# Patient Record
Sex: MALE | Race: WHITE | NOT HISPANIC OR LATINO | Employment: FULL TIME | URBAN - METROPOLITAN AREA
[De-identification: names, ages, dates, MRNs, and addresses within clinical notes are randomized per-mention and may not be internally consistent; named-entity substitution may affect disease eponyms.]

---

## 2020-06-21 ENCOUNTER — HOSPITAL ENCOUNTER (EMERGENCY)
Facility: HOSPITAL | Age: 30
Discharge: HOME | End: 2020-06-21
Attending: EMERGENCY MEDICINE | Admitting: EMERGENCY MEDICINE

## 2020-06-21 ENCOUNTER — APPOINTMENT (OUTPATIENT)
Dept: GENERAL RADIOLOGY | Facility: HOSPITAL | Age: 30
End: 2020-06-21

## 2020-06-21 VITALS
DIASTOLIC BLOOD PRESSURE: 93 MMHG | HEART RATE: 80 BPM | WEIGHT: 210 LBS | SYSTOLIC BLOOD PRESSURE: 144 MMHG | HEIGHT: 73 IN | OXYGEN SATURATION: 100 % | TEMPERATURE: 97.3 F | BODY MASS INDEX: 27.83 KG/M2 | RESPIRATION RATE: 16 BRPM

## 2020-06-21 DIAGNOSIS — R07.9 ACUTE CHEST PAIN: Primary | ICD-10-CM

## 2020-06-21 DIAGNOSIS — R00.2 PALPITATIONS: ICD-10-CM

## 2020-06-21 LAB
ALBUMIN SERPL-MCNC: 4.7 G/DL (ref 3.5–5.2)
ALBUMIN/GLOB SERPL: 1.8 G/DL
ALP SERPL-CCNC: 77 U/L (ref 39–117)
ALT SERPL W P-5'-P-CCNC: 20 U/L (ref 1–41)
ANION GAP SERPL CALCULATED.3IONS-SCNC: 12 MMOL/L (ref 5–15)
AST SERPL-CCNC: 20 U/L (ref 1–40)
BASOPHILS # BLD AUTO: 0.04 10*3/MM3 (ref 0–0.2)
BASOPHILS NFR BLD AUTO: 0.5 % (ref 0–1.5)
BILIRUB SERPL-MCNC: 0.2 MG/DL (ref 0.2–1.2)
BUN BLD-MCNC: 8 MG/DL (ref 6–20)
BUN/CREAT SERPL: 8.5 (ref 7–25)
CALCIUM SPEC-SCNC: 9.1 MG/DL (ref 8.6–10.5)
CHLORIDE SERPL-SCNC: 102 MMOL/L (ref 98–107)
CO2 SERPL-SCNC: 25 MMOL/L (ref 22–29)
CREAT BLD-MCNC: 0.94 MG/DL (ref 0.76–1.27)
D DIMER PPP FEU-MCNC: <0.27 MCGFEU/ML (ref 0–0.56)
DEPRECATED RDW RBC AUTO: 39.9 FL (ref 37–54)
EOSINOPHIL # BLD AUTO: 0.33 10*3/MM3 (ref 0–0.4)
EOSINOPHIL NFR BLD AUTO: 3.8 % (ref 0.3–6.2)
ERYTHROCYTE [DISTWIDTH] IN BLOOD BY AUTOMATED COUNT: 12.5 % (ref 12.3–15.4)
GFR SERPL CREATININE-BSD FRML MDRD: 96 ML/MIN/1.73
GLOBULIN UR ELPH-MCNC: 2.6 GM/DL
GLUCOSE BLD-MCNC: 113 MG/DL (ref 65–99)
HCT VFR BLD AUTO: 45.4 % (ref 37.5–51)
HGB BLD-MCNC: 15.6 G/DL (ref 13–17.7)
HOLD SPECIMEN: NORMAL
HOLD SPECIMEN: NORMAL
IMM GRANULOCYTES # BLD AUTO: 0.02 10*3/MM3 (ref 0–0.05)
IMM GRANULOCYTES NFR BLD AUTO: 0.2 % (ref 0–0.5)
LIPASE SERPL-CCNC: 22 U/L (ref 13–60)
LYMPHOCYTES # BLD AUTO: 2.77 10*3/MM3 (ref 0.7–3.1)
LYMPHOCYTES NFR BLD AUTO: 31.9 % (ref 19.6–45.3)
MAGNESIUM SERPL-MCNC: 2 MG/DL (ref 1.6–2.6)
MCH RBC QN AUTO: 30 PG (ref 26.6–33)
MCHC RBC AUTO-ENTMCNC: 34.4 G/DL (ref 31.5–35.7)
MCV RBC AUTO: 87.3 FL (ref 79–97)
MONOCYTES # BLD AUTO: 0.62 10*3/MM3 (ref 0.1–0.9)
MONOCYTES NFR BLD AUTO: 7.1 % (ref 5–12)
NEUTROPHILS # BLD AUTO: 4.9 10*3/MM3 (ref 1.7–7)
NEUTROPHILS NFR BLD AUTO: 56.5 % (ref 42.7–76)
NRBC BLD AUTO-RTO: 0 /100 WBC (ref 0–0.2)
NT-PROBNP SERPL-MCNC: 18.5 PG/ML (ref 5–450)
PLATELET # BLD AUTO: 336 10*3/MM3 (ref 140–450)
PMV BLD AUTO: 8.9 FL (ref 6–12)
POTASSIUM BLD-SCNC: 4.2 MMOL/L (ref 3.5–5.2)
PROT SERPL-MCNC: 7.3 G/DL (ref 6–8.5)
RBC # BLD AUTO: 5.2 10*6/MM3 (ref 4.14–5.8)
SODIUM BLD-SCNC: 139 MMOL/L (ref 136–145)
TROPONIN T SERPL-MCNC: <0.01 NG/ML (ref 0–0.03)
TROPONIN T SERPL-MCNC: <0.01 NG/ML (ref 0–0.03)
TSH SERPL DL<=0.05 MIU/L-ACNC: 0.38 UIU/ML (ref 0.27–4.2)
WBC NRBC COR # BLD: 8.68 10*3/MM3 (ref 3.4–10.8)
WHOLE BLOOD HOLD SPECIMEN: NORMAL
WHOLE BLOOD HOLD SPECIMEN: NORMAL

## 2020-06-21 PROCEDURE — 83735 ASSAY OF MAGNESIUM: CPT | Performed by: NURSE PRACTITIONER

## 2020-06-21 PROCEDURE — 85379 FIBRIN DEGRADATION QUANT: CPT | Performed by: NURSE PRACTITIONER

## 2020-06-21 PROCEDURE — 83690 ASSAY OF LIPASE: CPT | Performed by: NURSE PRACTITIONER

## 2020-06-21 PROCEDURE — 93005 ELECTROCARDIOGRAM TRACING: CPT | Performed by: EMERGENCY MEDICINE

## 2020-06-21 PROCEDURE — 80053 COMPREHEN METABOLIC PANEL: CPT | Performed by: EMERGENCY MEDICINE

## 2020-06-21 PROCEDURE — 83880 ASSAY OF NATRIURETIC PEPTIDE: CPT | Performed by: NURSE PRACTITIONER

## 2020-06-21 PROCEDURE — 99284 EMERGENCY DEPT VISIT MOD MDM: CPT

## 2020-06-21 PROCEDURE — 84443 ASSAY THYROID STIM HORMONE: CPT | Performed by: NURSE PRACTITIONER

## 2020-06-21 PROCEDURE — 85025 COMPLETE CBC W/AUTO DIFF WBC: CPT | Performed by: EMERGENCY MEDICINE

## 2020-06-21 PROCEDURE — 71046 X-RAY EXAM CHEST 2 VIEWS: CPT

## 2020-06-21 PROCEDURE — 84484 ASSAY OF TROPONIN QUANT: CPT | Performed by: EMERGENCY MEDICINE

## 2020-06-21 RX ORDER — SODIUM CHLORIDE 0.9 % (FLUSH) 0.9 %
10 SYRINGE (ML) INJECTION AS NEEDED
Status: DISCONTINUED | OUTPATIENT
Start: 2020-06-21 | End: 2020-06-21 | Stop reason: HOSPADM

## 2020-06-21 RX ORDER — DIAZEPAM 5 MG/1
5 TABLET ORAL ONCE
Status: COMPLETED | OUTPATIENT
Start: 2020-06-21 | End: 2020-06-21

## 2020-06-21 RX ADMIN — SODIUM CHLORIDE 1000 ML: 9 INJECTION, SOLUTION INTRAVENOUS at 17:18

## 2020-06-21 RX ADMIN — DIAZEPAM 5 MG: 5 TABLET ORAL at 17:18

## 2020-06-21 NOTE — ED PROVIDER NOTES
Subjective   Patient presents to the emergency department for chest pain and palpitations that occurred suddenly around 3 hours prior to arrival.  He tells me that his heart felt like it was beating very fast his Apple Watch told him it was around 107 bpm.  This lasted for less than an hour then essentially resolved.  He became concerned as he has not experienced anything like this before and will get checked out.  Tells me typically on Sundays he does experience somewhat of a hangover with some pain in his chest that he attributes to drinking the night before.  However this felt different.  He denies fever cough is not a smoker.      Palpitations   Palpitations quality:  Fast  Onset quality:  Sudden  Duration:  1 hour  Timing:  Constant  Progression:  Resolved  Chronicity:  New  Context: anxiety and dehydration    Relieved by:  Nothing  Worsened by:  Nothing  Ineffective treatments:  None tried  Associated symptoms: chest pain, dizziness and shortness of breath    Associated symptoms: no back pain, no cough, no diaphoresis, no nausea and no vomiting        Review of Systems   Constitutional: Negative for chills, diaphoresis and fever.   HENT: Negative for congestion and sore throat.    Respiratory: Positive for shortness of breath. Negative for cough, choking, chest tightness and wheezing.    Cardiovascular: Positive for chest pain and palpitations. Negative for leg swelling.   Gastrointestinal: Negative for abdominal distention, abdominal pain, anal bleeding, blood in stool, constipation, diarrhea, nausea and vomiting.   Genitourinary: Negative for difficulty urinating, dysuria, flank pain, frequency, hematuria and urgency.   Musculoskeletal: Negative for back pain.   Neurological: Positive for dizziness.   All other systems reviewed and are negative.      No past medical history on file.    No Known Allergies    No past surgical history on file.    No family history on file.    Social History     Socioeconomic  History   • Marital status: Single     Spouse name: Not on file   • Number of children: Not on file   • Years of education: Not on file   • Highest education level: Not on file           Objective   Physical Exam   Constitutional: He is oriented to person, place, and time. He appears well-developed and well-nourished.   HENT:   Head: Normocephalic and atraumatic.   Right Ear: External ear normal.   Left Ear: External ear normal.   Nose: Nose normal.   Mouth/Throat: Oropharynx is clear and moist.   Eyes: Pupils are equal, round, and reactive to light. Conjunctivae and EOM are normal.   Neck: Normal range of motion. Neck supple.   Cardiovascular: Normal rate, regular rhythm, normal heart sounds and intact distal pulses.   Pulmonary/Chest: Effort normal and breath sounds normal.   Abdominal: Soft. Bowel sounds are normal.   Musculoskeletal: Normal range of motion.   Neurological: He is alert and oriented to person, place, and time.   Skin: Skin is warm and dry.   Psychiatric: His behavior is normal. Judgment normal. His mood appears anxious.       Procedures           ED Course  ED Course as of Jun 21 2056   Sun Jun 21, 2020 2054 2- troponins 2- EKGs.  Patient resting comfortably for the duration of ER stay.  Blood work reassuring as well as d-dimer.  I will give the patient a follow-up to the palpitation clinic.  All thankful and agreeable.    [JM]      ED Course User Index  [JM] Rahul Rajput APRN           XR Chest 2 View   Final Result   No acute cardiopulmonary process.       DICTATED:   06/21/2020   EDITED/ls :   06/21/2020        This report was finalized on 6/21/2020 6:57 PM by Dr. Bethel Villavicencio.                                            [facility]    Final diagnoses:   Acute chest pain   Palpitations            Rahul Rajput APRN  06/21/20 2056

## 2020-06-22 NOTE — DISCHARGE INSTRUCTIONS
Follow up with one of the [facility] Primary Care Providers below to setup primary care. If you need assistance coordinating a primary care appointment with a [facility] Primary Care Provider, please contact the Primary Care Coordinators at [phone] for appointment scheduling.    [facility], Primary Care   [address] , [address]   [place], [place] [address] [phone]    [facility] Internal Medicine & Endocrinology  [address], [address]  [place] [address] [phone]    [facility] Family Medicine  [address], [address]   [place] [address] [phone]    [facility] Primary Care  [address], [address]  [place], [place] [address] [phone]    [facility], Primary Care,   [address], [address]   [place], [place] [address] [phone]    [facility] Primary Care  [address]  [place], [place] [address] [phone]    [facility], Primary Care  [address], [address]  [place], [place] [address] [phone]    [facility] Internal Medicine & Pediatrics  [address], [address]   [place] [address] [phone]    [facility], Primary Care  [address] [place] [address] [phone]      [facility] Primary Care  [address], [address]   [place] [address] [phone]    [facility] Family Medicine  [address] [place] [address] [phone]

## 2020-06-24 NOTE — PROGRESS NOTES
[facility]  Heart and Valve Center      Encounter Date:06/25/2020     Jarvis Arnett  [address] [facility] [address]  [unfilled]    1990    Mari Isbell MD    Jarvis Arnett is a 29 y.o. male.      Subjective:     Chief Complaint:  Palpitations and Establish Care       HPI     Answers for HPI/ROS submitted by the patient on 6/23/2020   What is the primary reason for your visit?: Other  Please describe your symptoms.: Sinus Arrhythmia. Erratic Heart Beat - had a PVC every 18ish beats. Shortness of Breath., , More specifically Sunday while sitting on my couch I felt like I was having a heart attack. My heart beat began speeding up and pounding uncontrollably hard, this lasted probably one minute. After breathing exercises it went away however that is what led me to the ER.  Have you had these symptoms before?: Yes  How long have you been having these symptoms?: 1-4 days  Please list any medications you are currently taking for this condition.: None.  Please describe any probable cause for these symptoms. : Alcohol, Lack of sleep, & caffiene is my best guess.    29-year-old male presented to [facility] ED on 6/21/2020 with complaints of palpitations.  Sudden onset of irregular fast heartbeat.  Duration 1 hour.  Patient checked his Apple Watch and heart rate was 107 bpm.  Associated chest pressure dizziness, near syncope, dyspnea. Attempted relaxation and deep breathing.  No improvement.  Worse when lying down.   Patient reports occasionally on Sundays he will have a hangover associated with chest pressure after drinking the night before.  ED evaluation unremarkable.  Referred to the heart valve center for evaluation.    Has continued.  Worse with lying.  Reports intermittent for at least 1 year.  Wax and wanes.  Normal physical activity tolerance.      No premature CAD 1st degree relative.  No family or personal hx of arrythmias    No hx of snoring.  Uses melatonin to help sleep.   "    When drinking 10 plus beers on Saturdays.    Prior to COVID.  No caffiene.  Now 1-2 cups daily.     Pt denies worsening or increased stress.        Patient Active Problem List    Diagnosis Date Noted   • Palpitations 06/25/2020         Past Surgical History:   Procedure Laterality Date   • CYST REMOVAL         No Known Allergies    No current outpatient medications on file.    The following portions of the patient's history were reviewed and updated today during office visit as appropriate: allergies, current medications, past family history, past medical history, past social history, past surgical history and problem list.    Review of Systems   Cardiovascular: Positive for chest pain, near-syncope and palpitations.   Respiratory: Positive for shortness of breath.    Neurological: Positive for dizziness.   Psychiatric/Behavioral: The patient is nervous/anxious.    All other systems reviewed and are negative.      Objective:     Vitals:    06/25/20 0827 06/25/20 0828 06/25/20 0829   BP: 117/73 116/70 120/73   BP Location: Left arm Left arm Right arm   Patient Position: Standing Sitting Sitting   Cuff Size: Adult Adult Adult   Pulse: 114 90 103   Resp:   18   Temp:   97.1 °F (36.2 °C)   TempSrc:   Temporal   SpO2: 99% 98% 99%   Weight:   93.9 kg (207 lb 1 oz)   Height:   185.4 cm (73\")       Body mass index is 27.32 kg/m².        Physical Exam   Constitutional: He is oriented to person, place, and time. He appears well-developed and well-nourished. No distress.   HENT:   Mouth/Throat: Oropharynx is clear and moist.   Eyes: Conjunctivae are normal. No scleral icterus.   Neck: No hepatojugular reflux and no JVD present. Carotid bruit is not present. No tracheal deviation present. No thyromegaly present.   Cardiovascular: Normal rate, regular rhythm, normal heart sounds and intact distal pulses. Exam reveals no friction rub.   No murmur heard.  Pulmonary/Chest: Effort normal and breath sounds normal.   Abdominal: " Soft. Bowel sounds are normal. He exhibits no distension. There is no tenderness.   Musculoskeletal: He exhibits no edema.   Lymphadenopathy:     He has no cervical adenopathy.   Neurological: He is alert and oriented to person, place, and time.   Skin: Skin is warm, dry and intact. No rash noted. No cyanosis or erythema. No pallor.   Psychiatric: He has a normal mood and affect. His behavior is normal. Thought content normal.   Vitals reviewed.      Lab and Diagnostic Review:  Admission on 06/21/2020, Discharged on 06/21/2020   Component Date Value Ref Range Status   • Glucose 06/21/2020 113* 65 - 99 mg/dL Final   • BUN 06/21/2020 8  6 - 20 mg/dL Final   • Creatinine 06/21/2020 0.94  0.76 - 1.27 mg/dL Final   • Sodium 06/21/2020 139  136 - 145 mmol/L Final   • Potassium 06/21/2020 4.2  3.5 - 5.2 mmol/L Final   • Chloride 06/21/2020 102  98 - 107 mmol/L Final   • CO2 06/21/2020 25.0  22.0 - 29.0 mmol/L Final   • Calcium 06/21/2020 9.1  8.6 - 10.5 mg/dL Final   • Total Protein 06/21/2020 7.3  6.0 - 8.5 g/dL Final   • Albumin 06/21/2020 4.70  3.50 - 5.20 g/dL Final   • ALT (SGPT) 06/21/2020 20  1 - 41 U/L Final   • AST (SGOT) 06/21/2020 20  1 - 40 U/L Final   • Alkaline Phosphatase 06/21/2020 77  39 - 117 U/L Final   • Total Bilirubin 06/21/2020 0.2  0.2 - 1.2 mg/dL Final   • eGFR Non African Amer 06/21/2020 96  >60 mL/min/1.73 Final   • Globulin 06/21/2020 2.6  gm/dL Final   • A/G Ratio 06/21/2020 1.8  g/dL Final   • BUN/Creatinine Ratio 06/21/2020 8.5  7.0 - 25.0 Final   • Anion Gap 06/21/2020 12.0  5.0 - 15.0 mmol/L Final   • Troponin T 06/21/2020 <0.010  0.000 - 0.030 ng/mL Final   • Extra Tube 06/21/2020 hold for add-on   Final    Auto resulted   • Extra Tube 06/21/2020 Hold for add-ons.   Final    Auto resulted.   • Extra Tube 06/21/2020 hold for add-on   Final    Auto resulted   • Extra Tube 06/21/2020 Hold for add-ons.   Final    Auto resulted.   • WBC 06/21/2020 8.68  3.40 - 10.80 10*3/mm3 Final   • RBC  06/21/2020 5.20  4.14 - 5.80 10*6/mm3 Final   • Hemoglobin 06/21/2020 15.6  13.0 - 17.7 g/dL Final   • Hematocrit 06/21/2020 45.4  37.5 - 51.0 % Final   • MCV 06/21/2020 87.3  79.0 - 97.0 fL Final   • MCH 06/21/2020 30.0  26.6 - 33.0 pg Final   • MCHC 06/21/2020 34.4  31.5 - 35.7 g/dL Final   • RDW 06/21/2020 12.5  12.3 - 15.4 % Final   • RDW-SD 06/21/2020 39.9  37.0 - 54.0 fl Final   • MPV 06/21/2020 8.9  6.0 - 12.0 fL Final   • Platelets 06/21/2020 336  140 - 450 10*3/mm3 Final   • Neutrophil % 06/21/2020 56.5  42.7 - 76.0 % Final   • Lymphocyte % 06/21/2020 31.9  19.6 - 45.3 % Final   • Monocyte % 06/21/2020 7.1  5.0 - 12.0 % Final   • Eosinophil % 06/21/2020 3.8  0.3 - 6.2 % Final   • Basophil % 06/21/2020 0.5  0.0 - 1.5 % Final   • Immature Grans % 06/21/2020 0.2  0.0 - 0.5 % Final   • Neutrophils, Absolute 06/21/2020 4.90  1.70 - 7.00 10*3/mm3 Final   • Lymphocytes, Absolute 06/21/2020 2.77  0.70 - 3.10 10*3/mm3 Final   • Monocytes, Absolute 06/21/2020 0.62  0.10 - 0.90 10*3/mm3 Final   • Eosinophils, Absolute 06/21/2020 0.33  0.00 - 0.40 10*3/mm3 Final   • Basophils, Absolute 06/21/2020 0.04  0.00 - 0.20 10*3/mm3 Final   • Immature Grans, Absolute 06/21/2020 0.02  0.00 - 0.05 10*3/mm3 Final   • nRBC 06/21/2020 0.0  0.0 - 0.2 /100 WBC Final   • Lipase 06/21/2020 22  13 - 60 U/L Final   • D-Dimer, Quantitative 06/21/2020 <0.27  0.00 - 0.56 MCGFEU/mL Final   • proBNP 06/21/2020 18.5  5.0 - 450.0 pg/mL Final   • TSH 06/21/2020 0.380  0.270 - 4.200 uIU/mL Final   • Magnesium 06/21/2020 2.0  1.6 - 2.6 mg/dL Final   • Troponin T 06/21/2020 <0.010  0.000 - 0.030 ng/mL Final     EKG 6/21/2020: Sinus rhythm with marked sinus arrhythmia 78 bpm.  Repeat EKG 6/21/2020: Sinus rhythm, 69 bpm    Chest x-ray 6/21/2020: No acute cardiopulmonary process  Assessment and Plan:         1. Palpitations    - Holter Monitor - 72 Hour Up To 21 Days; Future    2. Near syncope    - Holter Monitor - 72 Hour Up To 21 Days; Future    3.  Alcohol use  Encouraged to decrease ETOH use  Stay well hydrated    4. Shortness of breath    - Adult Transthoracic Echo Complete W/ Cont if Necessary Per Protocol; Future    F/u 6 weeks or sooner if needed (telehealth visit)    *Please note that portions of this note were completed with a voice recognition program. Efforts were made to edit the dictations, but occasionally words are mistranscribed.

## 2020-06-25 ENCOUNTER — HOSPITAL ENCOUNTER (OUTPATIENT)
Dept: CARDIOLOGY | Facility: HOSPITAL | Age: 30
Discharge: HOME | End: 2020-06-25
Admitting: NURSE PRACTITIONER

## 2020-06-25 ENCOUNTER — OFFICE VISIT (OUTPATIENT)
Dept: CARDIOLOGY | Facility: HOSPITAL | Age: 30
End: 2020-06-25

## 2020-06-25 VITALS
HEIGHT: 73 IN | TEMPERATURE: 97.1 F | RESPIRATION RATE: 18 BRPM | SYSTOLIC BLOOD PRESSURE: 120 MMHG | BODY MASS INDEX: 27.44 KG/M2 | DIASTOLIC BLOOD PRESSURE: 73 MMHG | OXYGEN SATURATION: 99 % | WEIGHT: 207.06 LBS | HEART RATE: 103 BPM

## 2020-06-25 DIAGNOSIS — R55 NEAR SYNCOPE: ICD-10-CM

## 2020-06-25 DIAGNOSIS — R00.2 PALPITATIONS: ICD-10-CM

## 2020-06-25 DIAGNOSIS — R00.2 PALPITATIONS: Primary | ICD-10-CM

## 2020-06-25 DIAGNOSIS — Z78.9 ALCOHOL USE: ICD-10-CM

## 2020-06-25 DIAGNOSIS — R06.02 SHORTNESS OF BREATH: ICD-10-CM

## 2020-06-25 PROCEDURE — 0296T HC EXT ECG > 48HR TO 21 DAY RCRD W/CONECT INTL RCRD: CPT

## 2020-06-25 PROCEDURE — 99214 OFFICE O/P EST MOD 30 MIN: CPT | Performed by: NURSE PRACTITIONER

## 2020-06-25 NOTE — PROGRESS NOTES
[facility] Heart Monitor Documentation    Jarvis Arnett  1990  [id number]  06/25/20    [employer]    [] ZIO XT Patch  Model [id number] Prescribed for N/A Days    · Serial Number: (N + 9 Digits) N   · Apply-By Date on Box:   · USPS Tracking Number:   · USPS Tracking        [] [employer] MINI PLUS Mobile Cardiac Telemetry  Model BGMINIPLUS Prescribed for  Days    · Serial Number: (BGM + 7 Digits) BGM  · Shipped-By Date on Box:   · UPS Tracking Number: [id number]  · UPS Tracking      [x] [employer] MINI Holter Monitor  Model BGMINIEL Prescribed for 14 Days    · Serial Number: (7 Digits) [id number]  · Shipped-By Date on Box: 06/20/2020  · UPS Tracking Number: [id number]  · UPS Tracking        This monitor was applied to the patient's chest and checked for proper functioning.  Mr. Jarvis Arnett was instructed in the proper use of this monitor.  He was given the opportunity to ask questions and left the office with the device 's instruction manual.    [facility], 8:56 AM, 06/25/20                  [facility]    Answers for HPI/ROS submitted by the patient on 6/23/2020   What is the primary reason for your visit?: Other  Please describe your symptoms.: Sinus Arrhythmia. Erratic Heart Beat - had a PVC every 18ish beats. Shortness of Breath., , More specifically Sunday while sitting on my couch I felt like I was having a heart attack. My heart beat began speeding up and pounding uncontrollably hard, this lasted probably one minute. After breathing exercises it went away however that is what led me to the ER.  Have you had these symptoms before?: Yes  How long have you been having these symptoms?: 1-4 days  Please list any medications you are currently taking for this condition.: None.  Please describe any probable cause for these symptoms. : Alcohol, Lack of sleep, & caffiene is my best guess.

## 2020-07-23 ENCOUNTER — HOSPITAL ENCOUNTER (OUTPATIENT)
Dept: CARDIOLOGY | Facility: HOSPITAL | Age: 30
Discharge: HOME | End: 2020-07-23
Admitting: NURSE PRACTITIONER

## 2020-07-23 VITALS — WEIGHT: 207 LBS | HEIGHT: 73 IN | BODY MASS INDEX: 27.43 KG/M2

## 2020-07-23 DIAGNOSIS — R06.02 SHORTNESS OF BREATH: ICD-10-CM

## 2020-07-23 LAB
ASCENDING AORTA: 2.6 CM
BH CV ECHO MEAS - AO MAX PG (FULL): 4.3 MMHG
BH CV ECHO MEAS - AO MAX PG: 7 MMHG
BH CV ECHO MEAS - AO MEAN PG (FULL): 3 MMHG
BH CV ECHO MEAS - AO MEAN PG: 4 MMHG
BH CV ECHO MEAS - AO ROOT AREA (BSA CORRECTED): 1.6
BH CV ECHO MEAS - AO ROOT AREA: 9.1 CM^2
BH CV ECHO MEAS - AO ROOT DIAM: 3.4 CM
BH CV ECHO MEAS - AO V2 MAX: 133 CM/SEC
BH CV ECHO MEAS - AO V2 MEAN: 89.9 CM/SEC
BH CV ECHO MEAS - AO V2 VTI: 29.4 CM
BH CV ECHO MEAS - ASC AORTA: 2.6 CM
BH CV ECHO MEAS - AVA(I,A): 2.1 CM^2
BH CV ECHO MEAS - AVA(I,D): 2.1 CM^2
BH CV ECHO MEAS - AVA(V,A): 2.3 CM^2
BH CV ECHO MEAS - AVA(V,D): 2.3 CM^2
BH CV ECHO MEAS - BSA(HAYCOCK): 2.2 M^2
BH CV ECHO MEAS - BSA: 2.2 M^2
BH CV ECHO MEAS - BZI_BMI: 27.3 KILOGRAMS/M^2
BH CV ECHO MEAS - BZI_METRIC_HEIGHT: 185.4 CM
BH CV ECHO MEAS - BZI_METRIC_WEIGHT: 93.9 KG
BH CV ECHO MEAS - EDV(CUBED): 108.5 ML
BH CV ECHO MEAS - EDV(MOD-SP2): 73 ML
BH CV ECHO MEAS - EDV(MOD-SP4): 88 ML
BH CV ECHO MEAS - EDV(TEICH): 106 ML
BH CV ECHO MEAS - EF(CUBED): 69.5 %
BH CV ECHO MEAS - EF(MOD-BP): 67 %
BH CV ECHO MEAS - EF(MOD-SP2): 69.9 %
BH CV ECHO MEAS - EF(MOD-SP4): 63.6 %
BH CV ECHO MEAS - EF(TEICH): 61 %
BH CV ECHO MEAS - ESV(CUBED): 33.1 ML
BH CV ECHO MEAS - ESV(MOD-SP2): 22 ML
BH CV ECHO MEAS - ESV(MOD-SP4): 32 ML
BH CV ECHO MEAS - ESV(TEICH): 41.3 ML
BH CV ECHO MEAS - FS: 32.7 %
BH CV ECHO MEAS - IVS/LVPW: 0.89
BH CV ECHO MEAS - IVSD: 0.82 CM
BH CV ECHO MEAS - LA DIMENSION: 3.2 CM
BH CV ECHO MEAS - LA/AO: 0.94
BH CV ECHO MEAS - LAD MAJOR: 6 CM
BH CV ECHO MEAS - LAT PEAK E' VEL: 16 CM/SEC
BH CV ECHO MEAS - LATERAL E/E' RATIO: 5.2
BH CV ECHO MEAS - LV DIASTOLIC VOL/BSA (35-75): 40.3 ML/M^2
BH CV ECHO MEAS - LV IVRT: 0.1 SEC
BH CV ECHO MEAS - LV MASS(C)D: 139.6 GRAMS
BH CV ECHO MEAS - LV MASS(C)DI: 63.9 GRAMS/M^2
BH CV ECHO MEAS - LV MAX PG: 2.7 MMHG
BH CV ECHO MEAS - LV MEAN PG: 1 MMHG
BH CV ECHO MEAS - LV SYSTOLIC VOL/BSA (12-30): 14.7 ML/M^2
BH CV ECHO MEAS - LV V1 MAX: 81.8 CM/SEC
BH CV ECHO MEAS - LV V1 MEAN: 50.5 CM/SEC
BH CV ECHO MEAS - LV V1 VTI: 16.4 CM
BH CV ECHO MEAS - LVIDD: 4.8 CM
BH CV ECHO MEAS - LVIDS: 3.2 CM
BH CV ECHO MEAS - LVLD AP2: 7.5 CM
BH CV ECHO MEAS - LVLD AP4: 8.6 CM
BH CV ECHO MEAS - LVLS AP2: 6.2 CM
BH CV ECHO MEAS - LVLS AP4: 7.1 CM
BH CV ECHO MEAS - LVOT AREA (M): 3.8 CM^2
BH CV ECHO MEAS - LVOT AREA: 3.8 CM^2
BH CV ECHO MEAS - LVOT DIAM: 2.2 CM
BH CV ECHO MEAS - LVPWD: 0.92 CM
BH CV ECHO MEAS - MED PEAK E' VEL: 11.8 CM/SEC
BH CV ECHO MEAS - MEDIAL E/E' RATIO: 7.1
BH CV ECHO MEAS - MV A MAX VEL: 36 CM/SEC
BH CV ECHO MEAS - MV DEC TIME: 0.3 SEC
BH CV ECHO MEAS - MV E MAX VEL: 83.6 CM/SEC
BH CV ECHO MEAS - MV E/A: 2.3
BH CV ECHO MEAS - MV MAX PG: 3.8 MMHG
BH CV ECHO MEAS - MV MEAN PG: 1 MMHG
BH CV ECHO MEAS - MV V2 MAX: 97.4 CM/SEC
BH CV ECHO MEAS - MV V2 MEAN: 52.7 CM/SEC
BH CV ECHO MEAS - MV V2 VTI: 31.7 CM
BH CV ECHO MEAS - MVA(VTI): 2 CM^2
BH CV ECHO MEAS - PA ACC SLOPE: 349 CM/SEC^2
BH CV ECHO MEAS - PA ACC TIME: 0.18 SEC
BH CV ECHO MEAS - PA MAX PG: 3.9 MMHG
BH CV ECHO MEAS - PA PR(ACCEL): -2 MMHG
BH CV ECHO MEAS - PA V2 MAX: 98.9 CM/SEC
BH CV ECHO MEAS - RAP SYSTOLE: 3 MMHG
BH CV ECHO MEAS - SI(AO): 122.2 ML/M^2
BH CV ECHO MEAS - SI(CUBED): 34.6 ML/M^2
BH CV ECHO MEAS - SI(LVOT): 28.6 ML/M^2
BH CV ECHO MEAS - SI(MOD-SP2): 23.4 ML/M^2
BH CV ECHO MEAS - SI(MOD-SP4): 25.6 ML/M^2
BH CV ECHO MEAS - SI(TEICH): 29.6 ML/M^2
BH CV ECHO MEAS - SV(AO): 266.9 ML
BH CV ECHO MEAS - SV(CUBED): 75.5 ML
BH CV ECHO MEAS - SV(LVOT): 62.3 ML
BH CV ECHO MEAS - SV(MOD-SP2): 51 ML
BH CV ECHO MEAS - SV(MOD-SP4): 56 ML
BH CV ECHO MEAS - SV(TEICH): 64.7 ML
BH CV ECHO MEAS - TAPSE (>1.6): 2.5 CM2
BH CV ECHO MEASUREMENTS AVERAGE E/E' RATIO: 6.01
BH CV VAS BP RIGHT ARM: NORMAL MMHG
BH CV XLRA - RV BASE: 3.3 CM
BH CV XLRA - RV LENGTH: 6.7 CM
BH CV XLRA - RV MID: 2.7 CM
BH CV XLRA - TDI S': 10.9 CM/SEC
IVRT: 104 MSEC
LEFT ATRIUM VOLUME INDEX: 24.3 ML/M^2
LEFT ATRIUM VOLUME: 53 ML
LV EF 2D ECHO EST: 60 %
MV VENA CONTRACTA: 0.3 CM

## 2020-07-23 PROCEDURE — 93306 TTE W/DOPPLER COMPLETE: CPT | Performed by: INTERNAL MEDICINE

## 2020-07-23 PROCEDURE — 93306 TTE W/DOPPLER COMPLETE: CPT

## 2020-07-24 PROCEDURE — 0298T HOLTER MONITOR - 72 HOUR UP TO 21 DAY: CPT | Performed by: INTERNAL MEDICINE

## 2020-08-14 ENCOUNTER — TELEMEDICINE (OUTPATIENT)
Dept: CARDIOLOGY | Facility: HOSPITAL | Age: 30
End: 2020-08-14

## 2020-08-14 VITALS — HEART RATE: 74 BPM | HEIGHT: 73 IN | BODY MASS INDEX: 27.43 KG/M2 | TEMPERATURE: 96.6 F | WEIGHT: 207 LBS

## 2020-08-14 DIAGNOSIS — R55 NEAR SYNCOPE: ICD-10-CM

## 2020-08-14 DIAGNOSIS — R00.2 PALPITATIONS: Primary | ICD-10-CM

## 2020-08-14 DIAGNOSIS — Z78.9 ALCOHOL USE: ICD-10-CM

## 2020-08-14 DIAGNOSIS — R06.02 SHORTNESS OF BREATH: ICD-10-CM

## 2020-08-14 DIAGNOSIS — F41.9 ANXIETY: ICD-10-CM

## 2020-08-14 PROCEDURE — 99214 OFFICE O/P EST MOD 30 MIN: CPT | Performed by: NURSE PRACTITIONER

## 2020-08-14 RX ORDER — PROPRANOLOL HYDROCHLORIDE 10 MG/1
10 TABLET ORAL 2 TIMES DAILY PRN
Qty: 30 TABLET | Refills: 2 | Status: SHIPPED | OUTPATIENT
Start: 2020-08-14

## 2020-08-14 NOTE — PROGRESS NOTES
[facility]  Heart and Valve Center      Encounter Date:08/14/2020     Jarvis Arnett  [address] [facility] [address]  [unfilled]    1990    Mari Isbell MD    Jarvis Arnett is a 29 y.o. male.    This was an audio and video enabled telemedicine encounter.    Subjective:     Chief Complaint:  Palpitations and Follow-up       HPI     29-year-old male follow-up on palpitations.  Associated chest pressure, dizziness, near syncope, dyspnea.  On last office visit felt associated with alcohol intake.    Echocardiogram and cardiac heart monitor was ordered.  Echocardiogram with normal EF, cardiac valves anatomically and functionally normal.  Cardiac heart monitor worn for 14 days with average heart rate 77 bpm PACs and PVCs less than 1%.  No concerning arrhythmias.    Since last visit, no CP, pressure, dyspnea, dizziness, syncope.  S/s have improved, but will still have palps and anxiety associated with palps intermittently.  Worse on weekends.  Trying to decrease ETOH use and stay well hydrated.  Can exercise without any concerns.     Patient Active Problem List    Diagnosis Date Noted   • Palpitations 06/25/2020     Note Last Updated: 8/4/2020     · Echocardiogram 7/23/2020: EF 60%, cardiac valves anatomically and functionally normal  · 14-day extended Holter 6/25/2020: Average heart rate 77 bpm, PACs and PVCs less than 1%.  No significant arrhythmias           Past Surgical History:   Procedure Laterality Date   • CYST REMOVAL         No Known Allergies      Current Outpatient Medications:   •  propranolol (INDERAL) 10 MG tablet, Take 1 tablet by mouth 2 (Two) Times a Day As Needed (anxiety and palpitations.)., Disp: 30 tablet, Rfl: 2    The following portions of the patient's history were reviewed and updated today during office visit as appropriate: allergies, current medications, past family history, past medical history, past social history, past surgical history and problem  "list.    Review of Systems   Cardiovascular: Positive for palpitations. Negative for chest pain and near-syncope.   Respiratory: Negative for shortness of breath.    Neurological: Negative for dizziness.   Psychiatric/Behavioral: The patient is nervous/anxious.    All other systems reviewed and are negative.      Objective:     Vitals:    08/14/20 0840   Pulse: 74   Temp: 96.6 °F (35.9 °C)   TempSrc: Oral   Weight: 93.9 kg (207 lb)   Height: 185.4 cm (73\")       Body mass index is 27.31 kg/m².      Physical Exam   Constitutional: He is oriented to person, place, and time. He appears well-developed and well-nourished. No distress.   Pulmonary/Chest: Effort normal and breath sounds normal.   Musculoskeletal: He exhibits no edema.   Neurological: He is alert and oriented to person, place, and time.   Skin: No pallor.   Psychiatric: He has a normal mood and affect. His behavior is normal. Thought content normal.       Lab and Diagnostic Review:    Assessment and Plan:         1. Palpitations    - propranolol (INDERAL) 10 MG tablet; Take 1 tablet by mouth 2 (Two) Times a Day As Needed (anxiety and palpitations.).  Dispense: 30 tablet; Refill: 2    2. Shortness of breath  Improved  Normal echo    3. Near syncope  resolved    4. Alcohol use  Drink ETOH in moderation 2-3 per day    5. Anxiety  Will trial BB but may need to see PCP      Telehealth visit in 6 weeks or sooner if needed.     *Please note that portions of this note were completed with a voice recognition program. Efforts were made to edit the dictations, but occasionally words are mistranscribed.    "

## 2020-09-25 ENCOUNTER — TELEMEDICINE (OUTPATIENT)
Dept: CARDIOLOGY | Facility: HOSPITAL | Age: 30
End: 2020-09-25

## 2020-09-25 VITALS
BODY MASS INDEX: 26.31 KG/M2 | DIASTOLIC BLOOD PRESSURE: 73 MMHG | HEART RATE: 50 BPM | SYSTOLIC BLOOD PRESSURE: 120 MMHG | HEIGHT: 74 IN | WEIGHT: 205 LBS

## 2020-09-25 DIAGNOSIS — Z78.9 ALCOHOL USE: ICD-10-CM

## 2020-09-25 DIAGNOSIS — F41.9 ANXIETY: ICD-10-CM

## 2020-09-25 DIAGNOSIS — R00.2 PALPITATIONS: Primary | ICD-10-CM

## 2020-09-25 PROCEDURE — 99213 OFFICE O/P EST LOW 20 MIN: CPT | Performed by: NURSE PRACTITIONER

## 2020-09-25 NOTE — PROGRESS NOTES
[facility]  Heart and Valve Center      Encounter Date:09/25/2020     Jarvis Arnett  [address] [facility] [address]  [unfilled]    1990    Mari Isbell MD    Jarvis Arnett is a 29 y.o. male.    This was an audio and video enabled telemedicine encounter.    Subjective:     Chief Complaint:  Palpitations       HPI     29-year-old male follow-up on palpitations.  On last office visit patient reported continued palpitations associated with anxiety.  Patient trying to decrease alcohol intake.  Trialed on propranolol.    Pt states his palps have improved.  He is only taking BB PRN on the weekends.  No CP, pressure, dizziness, syncope, edema.  Anxiety better controlled.  More associated with recent new palps, no resolved    Patient Active Problem List    Diagnosis Date Noted   • Palpitations 06/25/2020     Note Last Updated: 8/4/2020     · Echocardiogram 7/23/2020: EF 60%, cardiac valves anatomically and functionally normal  · 14-day extended Holter 6/25/2020: Average heart rate 77 bpm, PACs and PVCs less than 1%.  No significant arrhythmias           Past Surgical History:   Procedure Laterality Date   • CYST REMOVAL         No Known Allergies      Current Outpatient Medications:   •  propranolol (INDERAL) 10 MG tablet, Take 1 tablet by mouth 2 (Two) Times a Day As Needed (anxiety and palpitations.)., Disp: 30 tablet, Rfl: 2    The following portions of the patient's history were reviewed and updated today during office visit as appropriate: allergies, current medications, past family history, past medical history, past social history, past surgical history and problem list.    Review of Systems   Cardiovascular: Negative for chest pain, near-syncope and palpitations.   Respiratory: Negative for shortness of breath.    Neurological: Negative for dizziness.   Psychiatric/Behavioral: The patient is not nervous/anxious.    All other systems reviewed and are negative.      Objective:  "    Vitals:    09/25/20 0901   BP: 120/73   Pulse: 50   Weight: 93 kg (205 lb)   Height: 188 cm (74\")       Body mass index is 26.32 kg/m².      Vitals signs reviewed.   Constitutional:       Appearance: Healthy appearance. Not in distress.   Pulmonary:      Effort: Pulmonary effort is normal.   Skin:     Coloration: Skin is not pale.   Neurological:      Mental Status: Alert and oriented to person, place and time.   Psychiatric:         Behavior: Behavior is cooperative.         Lab and Diagnostic Review:    Assessment and Plan:         1. Palpitations  PrN Propranolol    2. Alcohol use  Continue to use in moderation    3. Anxiety  Improved.     F/u PRN.  See PCP routinely.       *Please note that portions of this note were completed with a voice recognition program. Efforts were made to edit the dictations, but occasionally words are mistranscribed.    "

## 2023-10-30 NOTE — PROGRESS NOTES
You have chosen to receive care through the use of telemedicine. Telemedicine enables health care providers at different locations to provide safe, effective, and convenient care through the use of technology. As with any health care service, there are risks associated with the use of telemedicine, including equipment failure, poor connections, and  issues.    • Do you understand the risks and benefits of telemedicine as I have explained them to you? Yes  • Have your questions regarding telemedicine been answered? Yes  • Do you consent to the use of telemedicine in your medical care today? Yes     30-Oct-2023 11:28